# Patient Record
Sex: FEMALE | Employment: OTHER | URBAN - METROPOLITAN AREA
[De-identification: names, ages, dates, MRNs, and addresses within clinical notes are randomized per-mention and may not be internally consistent; named-entity substitution may affect disease eponyms.]

---

## 2017-08-23 ENCOUNTER — HOSPITAL ENCOUNTER (OUTPATIENT)
Dept: OTHER | Age: 82
Discharge: HOME | End: 2017-08-23

## 2017-08-23 LAB
A/G RATIO: 1.3 (ref 1.1–2.2)
ALBUMIN SERPL-MCNC: 4.4 G/DL (ref 3.4–5)
ALP BLD-CCNC: 88 U/L (ref 40–129)
ALT SERPL-CCNC: 10 U/L (ref 10–40)
ANION GAP SERPL CALCULATED.3IONS-SCNC: 17 MMOL/L (ref 3–16)
AST SERPL-CCNC: 19 U/L (ref 15–37)
BILIRUB SERPL-MCNC: 0.3 MG/DL (ref 0–1)
BUN BLDV-MCNC: 15 MG/DL (ref 7–20)
CALCIUM SERPL-MCNC: 9.5 MG/DL (ref 8.3–10.6)
CHLORIDE BLD-SCNC: 106 MMOL/L (ref 99–110)
CO2: 23 MMOL/L (ref 21–32)
CREAT SERPL-MCNC: 1 MG/DL (ref 0.6–1.2)
GFR AFRICAN AMERICAN: >60
GFR NON-AFRICAN AMERICAN: 53
GLOBULIN: 3.4 G/DL
GLUCOSE BLD-MCNC: 87 MG/DL (ref 70–99)
HCT VFR BLD CALC: 38.6 % (ref 36–48)
HEMOGLOBIN: 12.8 G/DL (ref 12–16)
MCH RBC QN AUTO: 31.3 PG (ref 26–34)
MCHC RBC AUTO-ENTMCNC: 33.1 G/DL (ref 31–36)
MCV RBC AUTO: 94.4 FL (ref 80–100)
PDW BLD-RTO: 14.1 % (ref 12.4–15.4)
PLATELET # BLD: 361 K/UL (ref 135–450)
PMV BLD AUTO: 8.7 FL (ref 5–10.5)
POTASSIUM SERPL-SCNC: 4.4 MMOL/L (ref 3.5–5.1)
RBC # BLD: 4.09 M/UL (ref 4–5.2)
SODIUM BLD-SCNC: 146 MMOL/L (ref 136–145)
T4 TOTAL: 9.5 UG/DL (ref 4.5–10.9)
TOTAL PROTEIN: 7.8 G/DL (ref 6.4–8.2)
TSH SERPL DL<=0.05 MIU/L-ACNC: 0.44 UIU/ML (ref 0.27–4.2)
WBC # BLD: 6.5 K/UL (ref 4–11)

## 2017-09-12 ENCOUNTER — HOSPITAL ENCOUNTER (OUTPATIENT)
Dept: OTHER | Age: 82
Discharge: HOME | End: 2017-09-12
Attending: ORTHOPAEDIC SURGERY | Admitting: ORTHOPAEDIC SURGERY

## 2017-09-12 DIAGNOSIS — J18.9 PNEUMONIA DUE TO INFECTIOUS ORGANISM, UNSPECIFIED LATERALITY, UNSPECIFIED PART OF LUNG: ICD-10-CM

## 2018-01-01 ENCOUNTER — APPOINTMENT (OUTPATIENT)
Dept: CT IMAGING | Age: 83
DRG: 064 | End: 2018-01-01
Payer: MEDICARE

## 2018-01-01 ENCOUNTER — APPOINTMENT (OUTPATIENT)
Dept: GENERAL RADIOLOGY | Age: 83
DRG: 064 | End: 2018-01-01
Payer: MEDICARE

## 2018-01-01 ENCOUNTER — HOSPITAL ENCOUNTER (INPATIENT)
Age: 83
LOS: 1 days | DRG: 064 | End: 2018-11-01
Attending: EMERGENCY MEDICINE | Admitting: INTERNAL MEDICINE
Payer: MEDICARE

## 2018-01-01 VITALS
BODY MASS INDEX: 20.73 KG/M2 | TEMPERATURE: 97.3 F | HEIGHT: 63 IN | RESPIRATION RATE: 16 BRPM | WEIGHT: 117 LBS | DIASTOLIC BLOOD PRESSURE: 72 MMHG | SYSTOLIC BLOOD PRESSURE: 121 MMHG | OXYGEN SATURATION: 33 % | HEART RATE: 51 BPM

## 2018-01-01 DIAGNOSIS — I63.9 CEREBROVASCULAR ACCIDENT (CVA), UNSPECIFIED MECHANISM (HCC): Primary | ICD-10-CM

## 2018-01-01 DIAGNOSIS — R77.8 ELEVATED TROPONIN: ICD-10-CM

## 2018-01-01 DIAGNOSIS — R91.1 PULMONARY NODULE: ICD-10-CM

## 2018-01-01 LAB
A/G RATIO: 1.5 (ref 1.1–2.2)
ALBUMIN SERPL-MCNC: 3.7 G/DL (ref 3.4–5)
ALP BLD-CCNC: 69 U/L (ref 40–129)
ALT SERPL-CCNC: 13 U/L (ref 10–40)
ANION GAP SERPL CALCULATED.3IONS-SCNC: 13 MMOL/L (ref 3–16)
APTT: 28.2 SEC (ref 26–36)
AST SERPL-CCNC: 37 U/L (ref 15–37)
BACTERIA: ABNORMAL /HPF
BASOPHILS ABSOLUTE: 0.1 K/UL (ref 0–0.2)
BASOPHILS RELATIVE PERCENT: 0.6 %
BILIRUB SERPL-MCNC: 0.5 MG/DL (ref 0–1)
BILIRUBIN URINE: NEGATIVE
BLOOD, URINE: ABNORMAL
BUN BLDV-MCNC: 23 MG/DL (ref 7–20)
CALCIUM SERPL-MCNC: 8.5 MG/DL (ref 8.3–10.6)
CHLORIDE BLD-SCNC: 109 MMOL/L (ref 99–110)
CLARITY: ABNORMAL
CO2: 19 MMOL/L (ref 21–32)
COLOR: YELLOW
CREAT SERPL-MCNC: 0.9 MG/DL (ref 0.6–1.2)
EOSINOPHILS ABSOLUTE: 0 K/UL (ref 0–0.6)
EOSINOPHILS RELATIVE PERCENT: 0.1 %
EPITHELIAL CELLS, UA: 0 /HPF (ref 0–5)
GFR AFRICAN AMERICAN: >60
GFR NON-AFRICAN AMERICAN: 59
GLOBULIN: 2.4 G/DL
GLUCOSE BLD-MCNC: 110 MG/DL (ref 70–99)
GLUCOSE URINE: NEGATIVE MG/DL
HCT VFR BLD CALC: 37.9 % (ref 36–48)
HEMOGLOBIN: 12.4 G/DL (ref 12–16)
HYALINE CASTS: 8 /LPF (ref 0–8)
INR BLD: 0.99 (ref 0.86–1.14)
KETONES, URINE: ABNORMAL MG/DL
LEUKOCYTE ESTERASE, URINE: ABNORMAL
LYMPHOCYTES ABSOLUTE: 0.8 K/UL (ref 1–5.1)
LYMPHOCYTES RELATIVE PERCENT: 10.1 %
MCH RBC QN AUTO: 30.5 PG (ref 26–34)
MCHC RBC AUTO-ENTMCNC: 32.7 G/DL (ref 31–36)
MCV RBC AUTO: 93.3 FL (ref 80–100)
MICROSCOPIC EXAMINATION: YES
MONOCYTES ABSOLUTE: 0.7 K/UL (ref 0–1.3)
MONOCYTES RELATIVE PERCENT: 8.1 %
NEUTROPHILS ABSOLUTE: 6.6 K/UL (ref 1.7–7.7)
NEUTROPHILS RELATIVE PERCENT: 81.1 %
NITRITE, URINE: POSITIVE
PDW BLD-RTO: 14.3 % (ref 12.4–15.4)
PH UA: 5
PLATELET # BLD: 207 K/UL (ref 135–450)
PMV BLD AUTO: 8.4 FL (ref 5–10.5)
POTASSIUM REFLEX MAGNESIUM: 4 MMOL/L (ref 3.5–5.1)
PROTEIN UA: NEGATIVE MG/DL
PROTHROMBIN TIME: 11.3 SEC (ref 9.8–13)
RBC # BLD: 4.06 M/UL (ref 4–5.2)
RBC UA: 4 /HPF (ref 0–4)
SODIUM BLD-SCNC: 141 MMOL/L (ref 136–145)
SPECIFIC GRAVITY UA: 1.02
TOTAL CK: 621 U/L (ref 26–192)
TOTAL PROTEIN: 6.1 G/DL (ref 6.4–8.2)
TROPONIN: 0.11 NG/ML
URINE REFLEX TO CULTURE: YES
URINE TYPE: ABNORMAL
UROBILINOGEN, URINE: 0.2 E.U./DL
WBC # BLD: 8.1 K/UL (ref 4–11)
WBC UA: 55 /HPF (ref 0–5)

## 2018-01-01 PROCEDURE — 36415 COLL VENOUS BLD VENIPUNCTURE: CPT

## 2018-01-01 PROCEDURE — 99291 CRITICAL CARE FIRST HOUR: CPT

## 2018-01-01 PROCEDURE — 99223 1ST HOSP IP/OBS HIGH 75: CPT | Performed by: PSYCHIATRY & NEUROLOGY

## 2018-01-01 PROCEDURE — 72125 CT NECK SPINE W/O DYE: CPT

## 2018-01-01 PROCEDURE — 93005 ELECTROCARDIOGRAM TRACING: CPT | Performed by: EMERGENCY MEDICINE

## 2018-01-01 PROCEDURE — 85610 PROTHROMBIN TIME: CPT

## 2018-01-01 PROCEDURE — 87086 URINE CULTURE/COLONY COUNT: CPT

## 2018-01-01 PROCEDURE — 84484 ASSAY OF TROPONIN QUANT: CPT

## 2018-01-01 PROCEDURE — 2580000003 HC RX 258: Performed by: INTERNAL MEDICINE

## 2018-01-01 PROCEDURE — 85025 COMPLETE CBC W/AUTO DIFF WBC: CPT

## 2018-01-01 PROCEDURE — 87186 SC STD MICRODIL/AGAR DIL: CPT

## 2018-01-01 PROCEDURE — 80053 COMPREHEN METABOLIC PANEL: CPT

## 2018-01-01 PROCEDURE — 81001 URINALYSIS AUTO W/SCOPE: CPT

## 2018-01-01 PROCEDURE — 85730 THROMBOPLASTIN TIME PARTIAL: CPT

## 2018-01-01 PROCEDURE — 70450 CT HEAD/BRAIN W/O DYE: CPT

## 2018-01-01 PROCEDURE — 71045 X-RAY EXAM CHEST 1 VIEW: CPT

## 2018-01-01 PROCEDURE — 82550 ASSAY OF CK (CPK): CPT

## 2018-01-01 PROCEDURE — 2580000003 HC RX 258: Performed by: EMERGENCY MEDICINE

## 2018-01-01 PROCEDURE — 96360 HYDRATION IV INFUSION INIT: CPT

## 2018-01-01 PROCEDURE — 93010 ELECTROCARDIOGRAM REPORT: CPT | Performed by: INTERNAL MEDICINE

## 2018-01-01 PROCEDURE — 1200000000 HC SEMI PRIVATE

## 2018-01-01 PROCEDURE — 87077 CULTURE AEROBIC IDENTIFY: CPT

## 2018-01-01 RX ORDER — ASPIRIN 81 MG/1
81 TABLET, CHEWABLE ORAL DAILY
Status: DISCONTINUED | OUTPATIENT
Start: 2018-01-01 | End: 2018-01-01 | Stop reason: HOSPADM

## 2018-01-01 RX ORDER — LEVOTHYROXINE SODIUM 0.07 MG/1
75 TABLET ORAL DAILY
COMMUNITY

## 2018-01-01 RX ORDER — FLUTICASONE PROPIONATE 50 MCG
1 SPRAY, SUSPENSION (ML) NASAL DAILY
COMMUNITY

## 2018-01-01 RX ORDER — SODIUM CHLORIDE 9 MG/ML
INJECTION, SOLUTION INTRAVENOUS ONCE
Status: COMPLETED | OUTPATIENT
Start: 2018-01-01 | End: 2018-01-01

## 2018-01-01 RX ORDER — ATORVASTATIN CALCIUM 40 MG/1
40 TABLET, FILM COATED ORAL NIGHTLY
Status: DISCONTINUED | OUTPATIENT
Start: 2018-01-01 | End: 2018-01-01 | Stop reason: HOSPADM

## 2018-01-01 RX ORDER — SENNOSIDES 8.8 MG/5ML
5 LIQUID ORAL DAILY PRN
COMMUNITY

## 2018-01-01 RX ORDER — SODIUM CHLORIDE 9 MG/ML
INJECTION, SOLUTION INTRAVENOUS CONTINUOUS
Status: DISCONTINUED | OUTPATIENT
Start: 2018-01-01 | End: 2018-01-01 | Stop reason: HOSPADM

## 2018-01-01 RX ORDER — LOSARTAN POTASSIUM 50 MG/1
50 TABLET ORAL DAILY
COMMUNITY

## 2018-01-01 RX ADMIN — SODIUM CHLORIDE: 9 INJECTION, SOLUTION INTRAVENOUS at 20:22

## 2018-01-01 RX ADMIN — SODIUM CHLORIDE: 9 INJECTION, SOLUTION INTRAVENOUS at 03:01

## 2018-10-31 PROBLEM — I63.9 STROKE, ACUTE, EMBOLIC (HCC): Status: ACTIVE | Noted: 2018-01-01

## 2018-10-31 NOTE — ED PROVIDER NOTES
CVA.    CONSULTATIONS:  Dr. Marika Mullins, Dr. Wayne Dennis: Ivone Monique is a 80 y.o. female who presented because of stroke symptoms. She has a profound stroke affecting her speech and right side. [facility] Stroke Team has been consulted and thrombolytics will NOT be administered due to the risk/benefit profile of the drug and the patient's clinical presentation. The patient is outside the window to consider giving this medicine safely. Due to her known request for comfort care, we did not perform angiogram as aggressive intravascular measures (that could be life threatening) will not be considered either. I discussed the case in full with the admitting physician. Differential Diagnosis:  CVA, seizure, cardiac arrhythmia, infection, dehydration, other    FINAL IMPRESSION:    1. Cerebrovascular accident (CVA), unspecified mechanism ([address]    2. Elevated troponin    3. Pulmonary nodule      (Please note that I used voice recognition software to generate this note.   Occasionally words are mistranscribed despite my efforts to edit errors.)        Author MD Mariusz  11/01/18 0601

## 2018-11-01 NOTE — PROGRESS NOTES
Patient disoriented x4 and unable to communicate needs effectively. Patient turned q2h and traore in place. Patient completely flaccid on the R side. Confused at times.

## 2018-11-01 NOTE — CONSULTS
flexor on the left. Coordination: NT due to confusion  Sensation: NT due to confusion  Gait/Posture: NT due to confusion      Data:  LABS:   Lab Results   Component Value Date     10/31/2018    K 4.0 10/31/2018     10/31/2018    CO2 19 10/31/2018    BUN 23 10/31/2018    CREATININE 0.9 10/31/2018    GFRAA >60 10/31/2018    LABGLOM 59 10/31/2018    GLUCOSE 110 10/31/2018    CALCIUM 8.5 10/31/2018     Lab Results   Component Value Date    WBC 8.1 10/31/2018    RBC 4.06 10/31/2018    HGB 12.4 10/31/2018    HCT 37.9 10/31/2018    MCV 93.3 10/31/2018    RDW 14.3 10/31/2018     10/31/2018     Lab Results   Component Value Date    INR 0.99 10/31/2018    PROTIME 11.3 10/31/2018       Neuroimaging and/or  labs reviewed by me    Impression:  History of encephalopathy, aphasia and right-sided weakness likely consistent with new ischemic left MCA stroke. Possible thromboembolic versus cardioembolic  Hypertension  Hyperlipidemia  Dementia of AD  UTI       Recommendation:  MRI brain  Aspirin FL  Aspiration precautions  PT and OT  Speech evaluation  Telemetry  Blood pressure monitor and control  Continue antibiotics  Echo  Poor prognosis  Continue hydration  Further recommendation to follow depending on her POA decisions of further care. Thank you for referring such patient. If you have any questions regarding my consult note, please don't hesitate to call me. Chetan Friedman MD  [phone]    This dictation was generated by voice recognition computer software.  Although all attempts are made to edit the dictation for accuracy, there may be errors in the  transcription that are not intended

## 2018-11-02 PROBLEM — I63.522 ACUTE ISCHEMIC MULTIFOCAL LEFT-SIDED ANTERIOR CIRCULATION STROKE (HCC): Status: ACTIVE | Noted: 2018-01-01

## 2018-11-02 PROBLEM — T17.908A ASPIRATION INTO AIRWAY: Status: ACTIVE | Noted: 2018-11-02

## 2018-11-02 PROBLEM — J96.00 ACUTE RESPIRATORY FAILURE (HCC): Status: ACTIVE | Noted: 2018-11-02

## 2018-11-02 LAB
EKG ATRIAL RATE: 86 BPM
EKG DIAGNOSIS: NORMAL
EKG P AXIS: 53 DEGREES
EKG P-R INTERVAL: 148 MS
EKG Q-T INTERVAL: 462 MS
EKG QRS DURATION: 66 MS
EKG QTC CALCULATION (BAZETT): 552 MS
EKG R AXIS: -60 DEGREES
EKG T AXIS: -20 DEGREES
EKG VENTRICULAR RATE: 86 BPM

## 2018-11-03 LAB
ORGANISM: ABNORMAL
URINE CULTURE, ROUTINE: ABNORMAL
URINE CULTURE, ROUTINE: ABNORMAL